# Patient Record
Sex: FEMALE | Race: WHITE | NOT HISPANIC OR LATINO | Employment: OTHER | URBAN - METROPOLITAN AREA
[De-identification: names, ages, dates, MRNs, and addresses within clinical notes are randomized per-mention and may not be internally consistent; named-entity substitution may affect disease eponyms.]

---

## 2023-12-23 ENCOUNTER — OFFICE VISIT (OUTPATIENT)
Dept: URGENT CARE | Facility: CLINIC | Age: 78
End: 2023-12-23
Payer: MEDICARE

## 2023-12-23 VITALS
HEIGHT: 63 IN | RESPIRATION RATE: 20 BRPM | HEART RATE: 65 BPM | TEMPERATURE: 99 F | WEIGHT: 150 LBS | DIASTOLIC BLOOD PRESSURE: 90 MMHG | OXYGEN SATURATION: 96 % | BODY MASS INDEX: 26.58 KG/M2 | SYSTOLIC BLOOD PRESSURE: 158 MMHG

## 2023-12-23 DIAGNOSIS — I10 ELEVATED BLOOD PRESSURE READING IN OFFICE WITH DIAGNOSIS OF HYPERTENSION: ICD-10-CM

## 2023-12-23 DIAGNOSIS — R05.9 COUGH, UNSPECIFIED TYPE: ICD-10-CM

## 2023-12-23 DIAGNOSIS — U07.1 COVID-19: Primary | ICD-10-CM

## 2023-12-23 DIAGNOSIS — U07.1 COVID-19 VIRUS DETECTED: ICD-10-CM

## 2023-12-23 PROBLEM — M85.80 OSTEOPENIA: Status: ACTIVE | Noted: 2023-12-23

## 2023-12-23 PROBLEM — L50.9 URTICARIA: Status: ACTIVE | Noted: 2023-12-23

## 2023-12-23 PROBLEM — M54.9 BACK PAIN: Status: ACTIVE | Noted: 2023-12-23

## 2023-12-23 PROBLEM — R00.2 PALPITATIONS: Status: ACTIVE | Noted: 2023-12-23

## 2023-12-23 PROBLEM — R20.2 PARESTHESIA: Status: ACTIVE | Noted: 2023-12-23

## 2023-12-23 PROBLEM — E03.9 HYPOTHYROIDISM: Status: ACTIVE | Noted: 2023-12-23

## 2023-12-23 PROBLEM — M81.0 OSTEOPOROSIS: Status: ACTIVE | Noted: 2023-12-23

## 2023-12-23 PROBLEM — B02.9 HERPES ZOSTER: Status: ACTIVE | Noted: 2023-12-23

## 2023-12-23 LAB
CTP QC/QA: YES
CTP QC/QA: YES
POC MOLECULAR INFLUENZA A AGN: NEGATIVE
POC MOLECULAR INFLUENZA B AGN: NEGATIVE
SARS-COV-2 AG RESP QL IA.RAPID: POSITIVE

## 2023-12-23 PROCEDURE — 99204 PR OFFICE/OUTPT VISIT, NEW, LEVL IV, 45-59 MIN: ICD-10-PCS | Mod: S$GLB,,, | Performed by: PHYSICIAN ASSISTANT

## 2023-12-23 PROCEDURE — 87502 POCT INFLUENZA A/B MOLECULAR: ICD-10-PCS | Mod: QW,S$GLB,, | Performed by: PHYSICIAN ASSISTANT

## 2023-12-23 PROCEDURE — 87811 SARS CORONAVIRUS 2 ANTIGEN POCT, MANUAL READ: ICD-10-PCS | Mod: QW,S$GLB,, | Performed by: PHYSICIAN ASSISTANT

## 2023-12-23 PROCEDURE — 99204 OFFICE O/P NEW MOD 45 MIN: CPT | Mod: S$GLB,,, | Performed by: PHYSICIAN ASSISTANT

## 2023-12-23 PROCEDURE — 87811 SARS-COV-2 COVID19 W/OPTIC: CPT | Mod: QW,S$GLB,, | Performed by: PHYSICIAN ASSISTANT

## 2023-12-23 PROCEDURE — 87502 INFLUENZA DNA AMP PROBE: CPT | Mod: QW,S$GLB,, | Performed by: PHYSICIAN ASSISTANT

## 2023-12-23 RX ORDER — ATENOLOL 100 MG/1
100 TABLET ORAL
COMMUNITY
Start: 2023-11-14

## 2023-12-23 RX ORDER — BETAMETHASONE DIPROPIONATE 0.5 MG/G
CREAM TOPICAL
COMMUNITY
Start: 2023-08-16

## 2023-12-23 RX ORDER — DULOXETIN HYDROCHLORIDE 30 MG/1
30 CAPSULE, DELAYED RELEASE ORAL
COMMUNITY
Start: 2023-07-13

## 2023-12-23 RX ORDER — METHOTREXATE 2.5 MG/1
TABLET ORAL
COMMUNITY
Start: 2023-10-03

## 2023-12-23 RX ORDER — AMLODIPINE BESYLATE 2.5 MG/1
2.5 TABLET ORAL
COMMUNITY
Start: 2023-11-20

## 2023-12-23 RX ORDER — FOLIC ACID 1 MG/1
1000 TABLET ORAL
COMMUNITY
Start: 2023-10-16

## 2023-12-23 RX ORDER — HYDROXYCHLOROQUINE SULFATE 200 MG/1
200 TABLET, FILM COATED ORAL
COMMUNITY
Start: 2023-11-14

## 2023-12-23 RX ORDER — PENICILLIN V POTASSIUM 500 MG/1
500 TABLET, FILM COATED ORAL 4 TIMES DAILY
COMMUNITY
Start: 2023-08-07

## 2023-12-23 RX ORDER — FOSINOPRIL SODIUM 40 MG/1
40 TABLET ORAL
COMMUNITY
Start: 2023-12-02

## 2023-12-23 RX ORDER — ACETAMINOPHEN 500 MG
1000 TABLET ORAL
Status: COMPLETED | OUTPATIENT
Start: 2023-12-23 | End: 2023-12-23

## 2023-12-23 RX ADMIN — Medication 1000 MG: at 01:12

## 2023-12-23 NOTE — PATIENT INSTRUCTIONS
Recommend oral antihistamine (claritin, zyrtec, allegra,xyzal,benedryl)  for rhinorrhea, steroid nasal spray (flonase), OTC cough medicine (Mucinex-DM or Coricidin HBP® Maximum Strength Cold & Flu Day,  Tylenol (Acetaminophen) 650 mg TID and/or Motrin (Ibuprofen) as directed for control of pain and/or fever.    Patient positive for COVID-19.Counseled on CDC recommendations to stay home for at least 5 days and isolate from others in your home. Patient may end isolation after day 5 if asymptomatic or fever-free for 24 hours (without the use of fever-reducing medication).  Isolation is over on Dec 27,2023.     Discussed Paxlovid and its use during COVID19. Reviewed medications with patient and possible drug interactions with Paxlovid. If patient is on a statin for high cholesterol; please hold medication for 10 days while taking Paxlovid.      Paxlovid is given twice daily for 5 days, starting as soon as possible and within 5 days of symptom onset. The most common side effects of PAXLOVID include: altered sense of taste and diarrhea. Other possible side effects include: headache, vomiting, abdominal pain, nausea,high blood pressure, and feeling generally unwell.     PAXLOVID may cause serious side effects, including:Allergic reactions, including severe allergic reactions (anaphylaxis),skin rash, hives, blisters or peeling  skin, painful sores or ulcers in the mouth, nose, throat or genital area, and/ swelling of the mouth, lips, tongue or face.      Please drink plenty of fluids.  Please get plenty of rest.  Nasal irrigation with a saline spray or Netti Pot like device per their directions is also recommended.  If you  smoke, please stop smoking.    To help ease a sore throat, you can:  Use a sore throat spray.  Suck on hard candy or throat lozenges.  Gargle with warm saltwater a few times each day. Mix of 1/4 teaspoon (1.25 grams) salt in 8 ounces (240 mL) of warm water.  Use a cool mist humidifier to help you breathe  easier.      If you were positive (+) for a COVID  test today, please stay home for 5 days from the start of symptoms. You may return to work 6 days after onset of symptoms if you have improved symptoms and no fever for 24 hours. Please wear a mask for 5 days after symptom onset.       Discussed prescriptions and over-the-counter medicines to help with patient's symptoms:  A steroid nose spray (flonase) can help with a stuffy nose. It can also help with drainage down the back of your throat.  An antihistamine (loratadine,zyrtec,allegra, xyzal) can help with itching, sneezing, or runny nose.  An antihistamine eye drop can help with itchy eyes.  A decongestant (pseudoephedrine,  Phenylephrine) can help with a stuffy nose. Take <10 days for congestion and rhinorrhea. Once symptoms improve, proceed with loratadine/zyrtec once a day. These ingredients can keep you up all night, decrease appetite, feel jittery, and raise blood pressure with long term use.  OTC Coricidin can be used for patients with hypertension and palpitations because you cannot use ingredients such as pseudoephedrine and phenylephrine for oral decongestants.  Coricidin HBP Cough & Cold (Chlorpheniramine/Dextromethorphan)  Coricidin HBP Maximum Strength Multi-Symptom Flu  (Acetaminophen,Dextromethorphan, Chlorpheniramine)  Coricidin HBP® Maximum Strength Cold & Flu Day/Night (Acetaminophen,Dextromethorphan, Doxylamine, Guaifenesin)  Coricidin HBP Chest Congestion & Cough (Dextromethorphan + Guaifenesin)    Medications that control cough are suppressants and expectorants. Suppressants are tessalon pearls and dextromethorphan. If you have a productive cough with sputum, you need an expectorant called guaifenesin. Dextromethorphan and Guaifenesin are active ingredients in many OTC cough/cold medications such as Dayquil/Nyquil, Mucinex, and Robitussin Mucus+Chest Congestion.            Common Cold Medicine Ingredients Cheat sheet  Acetaminophen (APAP) -pain  reliever/fever reducer  Dextromethorphan - cough suppressant  Guaifenesin - expectorant/thins and loosens mucus  Phenylephrine - nasal decongestant  Diphenhydramine or Doxylamine succinate - antihistamine, helps you fall asleep  Promethazine or Brompheniramine - Prescription strength antihistamines    If not allergic, take Tylenol (Acetaminophen) 650 mg to  1 g every 6 hours as needed for fever and/or Motrin (Ibuprofen) 600 to 800 mg every 6 hours as needed for fever as directed for control of pain and/or fever          Please remember that you have received care at an urgent care today. Urgent cares are not emergency rooms and are not equipped to handle life threatening emergencies and cannot rule in or out certain medical conditions and you may be released before all of your medical problems are known or treated.     Please arrange follow up with your primary care physician or speciality clinic within 2-5 days if your signs and symptoms have not resolved or worsen.     Patient can call our Referral Hotline at [phone] to make an appointment.      Please return here or go to the Emergency Department for any concerns or worsening of condition.  Signs of infection. These include a fever of 100.4°F (38°C) or higher, chills, cough, more sputum or change in color of sputum.  You are having so much trouble breathing that you can only say one or two words at a time.  You need to sit upright at all times to be able to breathe and or cannot lie down.  You have trouble breathing when talking or sitting still.  You have a fever of 100.4°F (38°C) or higher or chills.  You have chest pain when you cough, have trouble breathing but can still talk in full sentences, or cough up blood.

## 2023-12-23 NOTE — PROGRESS NOTES
"Subjective:      Patient ID: Sirsiha Diaz is a 78 y.o. female.    Vitals:  height is 5' 2.5" (1.588 m) and weight is 68 kg (150 lb). Her temperature is 99.1 °F (37.3 °C). Her blood pressure is 158/90 (abnormal) and her pulse is 65. Her respiration is 20 and oxygen saturation is 96%.     Chief Complaint: Cough    Sirisha Diaz is a 78 y.o. female with hx of hypertension and allergic rhinitis who complains of cold symptoms (cough and sore throat) that began a day ago.  Patient denies fever. She has tried Mucinex -DM, flonase,  and tylenol 650 mg TID. She takes daily allegra and benadryl at night for allergies. She is present with her  with similar symptoms.     Patient denies hx of kidney disease, high cholesterol. Patient states she is using a lot of tissue to wipe nose.    Cough  This is a new problem. The current episode started yesterday. The problem has been gradually worsening. The cough is Non-productive. Associated symptoms include nasal congestion, postnasal drip, rhinorrhea and a sore throat. Pertinent negatives include no chest pain, chills, ear congestion, ear pain, fever, headaches, heartburn, hemoptysis, myalgias, rash, shortness of breath, sweats, weight loss or wheezing. Nothing aggravates the symptoms. She has tried OTC cough suppressant and rest (mucinex, flonase and tylenol) for the symptoms. The treatment provided mild relief. Her past medical history is significant for environmental allergies. There is no history of asthma, bronchitis, COPD, emphysema or pneumonia.       Constitution: Positive for appetite change, fatigue and generalized weakness. Negative for activity change, chills and fever.   HENT:  Positive for congestion, postnasal drip and sore throat. Negative for ear pain, sinus pain, sinus pressure, trouble swallowing and voice change.    Neck: Negative for neck stiffness and neck swelling.   Cardiovascular:  Negative for chest pain, leg swelling, palpitations and sob on " exertion.   Respiratory:  Positive for cough. Negative for sputum production, bloody sputum, shortness of breath, wheezing and asthma.    Gastrointestinal:  Negative for abdominal pain, nausea, vomiting and heartburn.   Musculoskeletal:  Negative for muscle cramps and muscle ache.   Skin:  Negative for rash.   Allergic/Immunologic: Positive for environmental allergies, seasonal allergies and immunizations up-to-date. Negative for asthma.   Neurological:  Negative for headaches.   Psychiatric/Behavioral:  Negative for nervous/anxious. The patient is not nervous/anxious.       Objective:     Physical Exam   Constitutional: She is oriented to person, place, and time.      Comments:Patient is awake and alert, sitting up in exam chair, speaking and answering in complete sentences, in no signs of acute distress     normal  HENT:   Head: Normocephalic and atraumatic.   Ears:   Right Ear: Tympanic membrane, external ear and ear canal normal.   Left Ear: Tympanic membrane, external ear and ear canal normal.   Nose: Rhinorrhea and congestion present.   Mouth/Throat: Mucous membranes are moist. No oropharyngeal exudate or posterior oropharyngeal erythema. Oropharynx is clear.      Comments:  postnasal discharge noted on the posterior pharyngeal wall    Eyes: Conjunctivae are normal. Pupils are equal, round, and reactive to light. Extraocular movement intact   Neck: Neck supple.   Cardiovascular: Normal rate, regular rhythm, normal heart sounds and normal pulses.   Pulmonary/Chest: Effort normal and breath sounds normal. No respiratory distress. She has no wheezes. She has no rhonchi. She has no rales.   Abdominal: Normal appearance.   Musculoskeletal: Normal range of motion.         General: Normal range of motion.      Cervical back: She exhibits tenderness.   Lymphadenopathy:     She has cervical adenopathy.   Neurological: She is alert and oriented to person, place, and time.   Skin: Skin is warm.   Psychiatric: Her  "behavior is normal. Mood, judgment and thought content normal.   Nursing note and vitals reviewed.    Vitals:    12/23/23 1238 12/23/23 1344   BP: (!) 165/93 (!) 158/90   Pulse: 65 65   Resp: 20    Temp: (!) 100.5 °F (38.1 °C) 99.1 °F (37.3 °C)   SpO2: 96%    Weight: 68 kg (150 lb)    Height: 5' 2.5" (1.588 m)          Assessment:     1. COVID-19    2. Cough, unspecified type    3. Elevated blood pressure reading in office with diagnosis of hypertension      Patient presents with clinical exam findings and history consistent with above.      On exam, patient is nontoxic appearing and vitals are stable.      Diagnostic testing results were reviewed and discussed with patient/guardian.   Tests ordered in clinic:  Results for orders placed or performed in visit on 12/23/23   SARS Coronavirus 2 Antigen, POCT Manual Read   Result Value Ref Range    SARS Coronavirus 2 Antigen Positive (A) Negative     Acceptable Yes    POCT Influenza A/B MOLECULAR   Result Value Ref Range    POC Molecular Influenza A Ag Negative Negative, Not Reported    POC Molecular Influenza B Ag Negative Negative, Not Reported     Acceptable Yes        Previous progress notes/admissions/labs and medications were reviewed.    Plan:       COVID-19  -     nirmatrelvir-ritonavir 300 mg (150 mg x 2)-100 mg copackaged tablets (EUA); Take 3 tablets by mouth 2 (two) times daily for 5 days. Each dose contains 2 nirmatrelvir (pink tablets) and 1 ritonavir (white tablet). Take all 3 tablets together  Dispense: 30 tablet; Refill: 0  -     Ambulatory referral/consult to Internal Medicine    Cough, unspecified type  -     SARS Coronavirus 2 Antigen, POCT Manual Read  -     POCT Influenza A/B MOLECULAR  -     acetaminophen tablet 1,000 mg    Elevated blood pressure reading in office with diagnosis of hypertension  -     Ambulatory referral/consult to Internal Medicine                    1) See orders for this visit as documented in the " "electronic medical record.  2) Symptomatic therapy suggested: use acetaminophen/ibuprofen every 6-8 hours prn pain or fever, push fluids.   3) Call or return to clinic prn if these symptoms worsen or fail to improve as anticipated.    Discussed results/diagnosis/plan with patient in clinic.  We had shared decision making for patient's treatment. Patient verbalized understanding and in agreement with current treatment plan.     Patient was instructed to return for re-evaluation with urgent care or PCP for continued outpatient workup and management if symptoms do not improve/worsening symptoms. Strict ED versus clinic precautions given in depth.    Discharge and follow-up instructions given verbally/printed with the patient who expressed understanding. The instructions and results are also available on [employer].              Brittney "Carmelita" CARMEN [place]          Patient Instructions   Recommend oral antihistamine (claritin, zyrtec, allegra,xyzal,benedryl)  for rhinorrhea, steroid nasal spray (flonase), OTC cough medicine (Mucinex-DM or Coricidin HBP® Maximum Strength Cold & Flu Day,  Tylenol (Acetaminophen) 650 mg TID and/or Motrin (Ibuprofen) as directed for control of pain and/or fever.    Patient positive for COVID-19.Counseled on CDC recommendations to stay home for at least 5 days and isolate from others in your home. Patient may end isolation after day 5 if asymptomatic or fever-free for 24 hours (without the use of fever-reducing medication).  Isolation is over on Dec 27,2023.     Discussed Paxlovid and its use during COVID19. Reviewed medications with patient and possible drug interactions with Paxlovid. If patient is on a statin for high cholesterol; please hold medication for 10 days while taking Paxlovid.      Paxlovid is given twice daily for 5 days, starting as soon as possible and within 5 days of symptom onset. The most common side effects of PAXLOVID include: altered sense of taste and diarrhea. Other " possible side effects include: headache, vomiting, abdominal pain, nausea,high blood pressure, and feeling generally unwell.     PAXLOVID may cause serious side effects, including:Allergic reactions, including severe allergic reactions (anaphylaxis),skin rash, hives, blisters or peeling  skin, painful sores or ulcers in the mouth, nose, throat or genital area, and/ swelling of the mouth, lips, tongue or face.      Please drink plenty of fluids.  Please get plenty of rest.  Nasal irrigation with a saline spray or Netti Pot like device per their directions is also recommended.  If you  smoke, please stop smoking.    To help ease a sore throat, you can:  Use a sore throat spray.  Suck on hard candy or throat lozenges.  Gargle with warm saltwater a few times each day. Mix of 1/4 teaspoon (1.25 grams) salt in 8 ounces (240 mL) of warm water.  Use a cool mist humidifier to help you breathe easier.      If you were positive (+) for a COVID  test today, please stay home for 5 days from the start of symptoms. You may return to work 6 days after onset of symptoms if you have improved symptoms and no fever for 24 hours. Please wear a mask for 5 days after symptom onset.       Discussed prescriptions and over-the-counter medicines to help with patient's symptoms:  A steroid nose spray (flonase) can help with a stuffy nose. It can also help with drainage down the back of your throat.  An antihistamine (loratadine,zyrtec,allegra, xyzal) can help with itching, sneezing, or runny nose.  An antihistamine eye drop can help with itchy eyes.  A decongestant (pseudoephedrine,  Phenylephrine) can help with a stuffy nose. Take <10 days for congestion and rhinorrhea. Once symptoms improve, proceed with loratadine/zyrtec once a day. These ingredients can keep you up all night, decrease appetite, feel jittery, and raise blood pressure with long term use.  OTC Coricidin can be used for patients with hypertension and palpitations because you  cannot use ingredients such as pseudoephedrine and phenylephrine for oral decongestants.  Coricidin HBP Cough & Cold (Chlorpheniramine/Dextromethorphan)  Coricidin HBP Maximum Strength Multi-Symptom Flu  (Acetaminophen,Dextromethorphan, Chlorpheniramine)  Coricidin HBP® Maximum Strength Cold & Flu Day/Night (Acetaminophen,Dextromethorphan, Doxylamine, Guaifenesin)  Coricidin HBP Chest Congestion & Cough (Dextromethorphan + Guaifenesin)    Medications that control cough are suppressants and expectorants. Suppressants are tessalon pearls and dextromethorphan. If you have a productive cough with sputum, you need an expectorant called guaifenesin. Dextromethorphan and Guaifenesin are active ingredients in many OTC cough/cold medications such as Dayquil/Nyquil, Mucinex, and Robitussin Mucus+Chest Congestion.            Common Cold Medicine Ingredients Cheat sheet  Acetaminophen (APAP) -pain reliever/fever reducer  Dextromethorphan - cough suppressant  Guaifenesin - expectorant/thins and loosens mucus  Phenylephrine - nasal decongestant  Diphenhydramine or Doxylamine succinate - antihistamine, helps you fall asleep  Promethazine or Brompheniramine - Prescription strength antihistamines    If not allergic, take Tylenol (Acetaminophen) 650 mg to  1 g every 6 hours as needed for fever and/or Motrin (Ibuprofen) 600 to 800 mg every 6 hours as needed for fever as directed for control of pain and/or fever          Please remember that you have received care at an urgent care today. Urgent cares are not emergency rooms and are not equipped to handle life threatening emergencies and cannot rule in or out certain medical conditions and you may be released before all of your medical problems are known or treated.     Please arrange follow up with your primary care physician or speciality clinic within 2-5 days if your signs and symptoms have not resolved or worsen.     Patient can call our Referral Hotline at [phone] to make  an appointment.      Please return here or go to the Emergency Department for any concerns or worsening of condition.  Signs of infection. These include a fever of 100.4°F (38°C) or higher, chills, cough, more sputum or change in color of sputum.  You are having so much trouble breathing that you can only say one or two words at a time.  You need to sit upright at all times to be able to breathe and or cannot lie down.  You have trouble breathing when talking or sitting still.  You have a fever of 100.4°F (38°C) or higher or chills.  You have chest pain when you cough, have trouble breathing but can still talk in full sentences, or cough up blood.